# Patient Record
Sex: FEMALE | Race: BLACK OR AFRICAN AMERICAN | NOT HISPANIC OR LATINO | URBAN - METROPOLITAN AREA
[De-identification: names, ages, dates, MRNs, and addresses within clinical notes are randomized per-mention and may not be internally consistent; named-entity substitution may affect disease eponyms.]

---

## 2019-02-08 NOTE — PATIENT DISCUSSION
Surgery  Counseling: I have discussed the option of glasses versus cataract surgery versus following. It was explained that when vision no longer meets the patient's visual needs and a new prescription for glasses is not likely to improve the patient's visual symptoms, the option of cataract surgery is a reasonable next step. It was explained that there is no guarantee that removing the cataract will improve their visual symptoms, however; it is believed that the cataract is contributing to the patient's visual impairment and surgery may significantly improve both the visual and functional status of the patient. The risks, benefits and alternatives of surgery were discussed with the patient. After this discussion, the patient desires to proceed with cataract surgery with implantation of an intraocular lens to improve vision for golfing and glare.

## 2019-02-08 NOTE — PATIENT DISCUSSION
CATARACT, OU - EQUALLY VISUALLY SIGNIFICANT . SCHEDULE SX OS THEN LATER IN OD IF VISUAL SYMPTOMS PERSIST.  GLS RX GIVEN TO FILL IF DESIRES IN THE EVENT PT DOES NOT PROCEED W/ SX.

## 2019-02-08 NOTE — PATIENT DISCUSSION
REFRACTIVE ERROR, OU - DISCUSSED OPTION OF CORRECTING AT THE TIME OF CATARACT SURGERY. PT UNDERSTANDS AND ELECTS TO CONSIDER THEIR OPTIONS. THE PT WOULD LIKE TO BE LESS DEPENDENT ON GLASSES AT A DISTANCE AND UNDERSTANDS THAT HE WILL NEED GLASSES FOR ALL NEAR AND INTERMEDIATE ACTIVITIES.

## 2019-02-28 NOTE — PATIENT DISCUSSION
Continue: prednisolone acetate (prednisolone acetate): drops,suspension: 1% 1 drop four times a day into left eye 02-

## 2019-02-28 NOTE — PATIENT DISCUSSION
Surgery  Counseling: I have discussed the option of glasses versus cataract surgery versus following . It was explained that when vision no longer meets the patient's visual needs and a new prescription for glasses is not likely to improve all of the patient's visual symptoms, the option of cataract surgery is a reasonable next step. It was explained that there is no guarantee that removing the cataract will improve their visual symptoms, however; it is believed that the cataract is contributing to the patient's visual impairment and surgery may significantly improve both the visual and functional status of the patient. The risks, benefits and alternatives of surgery were discussed with the patient. After this discussion, the patient desires to proceed with cataract surgery with implantation of an intraocular lens to improve vision and reduce glare at night.

## 2019-03-14 NOTE — PATIENT DISCUSSION
***This patient had refractive cataract surgery performed. A monofocal IOL was placed to achieve a target refraction of  PLANO (which should provide them with satisfactory distance vision).

## 2022-07-05 RX ORDER — ASPIRIN 81 MG/1
TABLET, CHEWABLE ORAL
COMMUNITY

## 2022-07-05 RX ORDER — LISINOPRIL AND HYDROCHLOROTHIAZIDE 12.5; 1 MG/1; MG/1
TABLET ORAL
COMMUNITY

## 2022-07-05 RX ORDER — TOLTERODINE 2 MG/1
1 CAPSULE, EXTENDED RELEASE ORAL
COMMUNITY

## 2022-07-05 RX ORDER — BACLOFEN 10 MG/5ML
INJECTION INTRATHECAL
COMMUNITY

## 2022-08-11 ENCOUNTER — NEW PATIENT (OUTPATIENT)
Dept: URBAN - METROPOLITAN AREA CLINIC 12 | Facility: CLINIC | Age: 77
End: 2022-08-11

## 2022-08-11 DIAGNOSIS — H52.4: ICD-10-CM

## 2022-08-11 DIAGNOSIS — H04.123: ICD-10-CM

## 2022-08-11 DIAGNOSIS — H25.13: ICD-10-CM

## 2022-08-11 PROCEDURE — 92015 DETERMINE REFRACTIVE STATE: CPT

## 2022-08-11 PROCEDURE — 99204 OFFICE O/P NEW MOD 45 MIN: CPT

## 2022-08-11 ASSESSMENT — VISUAL ACUITY
OU_SC: 20/80
OD_SC: 20/80
OS_SC: 20/100

## 2022-08-11 ASSESSMENT — TONOMETRY
OS_IOP_MMHG: 18
OD_IOP_MMHG: 17

## 2022-09-27 ENCOUNTER — DIAGNOSTICS ONLY (OUTPATIENT)
Dept: URBAN - METROPOLITAN AREA CLINIC 17 | Facility: CLINIC | Age: 77
End: 2022-09-27

## 2022-09-27 DIAGNOSIS — H40.013: ICD-10-CM

## 2022-09-27 PROCEDURE — 92133 CPTRZD OPH DX IMG PST SGM ON: CPT

## 2022-09-27 PROCEDURE — 92083 EXTENDED VISUAL FIELD XM: CPT

## 2022-10-11 ENCOUNTER — ESTABLISHED PATIENT (OUTPATIENT)
Dept: URBAN - METROPOLITAN AREA CLINIC 17 | Facility: CLINIC | Age: 77
End: 2022-10-11

## 2022-10-11 DIAGNOSIS — H25.13: ICD-10-CM

## 2022-10-11 DIAGNOSIS — H40.013: ICD-10-CM

## 2022-10-11 PROCEDURE — 99214 OFFICE O/P EST MOD 30 MIN: CPT

## 2022-10-11 PROCEDURE — 92136 OPHTHALMIC BIOMETRY: CPT

## 2022-10-11 ASSESSMENT — VISUAL ACUITY
OD_GLARE: 20/400
OD_SC: 20/50
OS_GLARE: 20/400
OS_SC: 20/200-1

## 2022-10-11 ASSESSMENT — TONOMETRY
OS_IOP_MMHG: 18
OD_IOP_MMHG: 16

## 2022-10-19 PROBLEM — C7A.012 MALIGNANT CARCINOID TUMOR OF THE ILEUM (HCC): Status: ACTIVE | Noted: 2022-10-19

## 2022-10-28 ENCOUNTER — POST-OP (OUTPATIENT)
Dept: URBAN - METROPOLITAN AREA CLINIC 17 | Facility: CLINIC | Age: 77
End: 2022-10-28

## 2022-10-28 DIAGNOSIS — Z96.1: ICD-10-CM

## 2022-10-28 PROCEDURE — 99024 POSTOP FOLLOW-UP VISIT: CPT

## 2022-10-28 ASSESSMENT — VISUAL ACUITY: OS_SC: 20/30

## 2022-10-28 ASSESSMENT — TONOMETRY: OS_IOP_MMHG: 20

## 2022-11-02 ENCOUNTER — POST-OP (OUTPATIENT)
Dept: URBAN - METROPOLITAN AREA CLINIC 17 | Facility: CLINIC | Age: 77
End: 2022-11-02

## 2022-11-02 DIAGNOSIS — H25.11: ICD-10-CM

## 2022-11-02 DIAGNOSIS — Z96.1: ICD-10-CM

## 2022-11-02 DIAGNOSIS — H25.13: ICD-10-CM

## 2022-11-02 PROCEDURE — 92136 OPHTHALMIC BIOMETRY: CPT

## 2022-11-02 PROCEDURE — 99024 POSTOP FOLLOW-UP VISIT: CPT

## 2022-11-02 ASSESSMENT — VISUAL ACUITY: OS_SC: 20/30

## 2022-11-02 ASSESSMENT — TONOMETRY: OS_IOP_MMHG: 16

## 2022-11-18 ENCOUNTER — POST-OP (OUTPATIENT)
Dept: URBAN - METROPOLITAN AREA CLINIC 17 | Facility: CLINIC | Age: 77
End: 2022-11-18

## 2022-11-18 DIAGNOSIS — Z96.1: ICD-10-CM

## 2022-11-18 PROCEDURE — 99024 POSTOP FOLLOW-UP VISIT: CPT

## 2022-11-18 ASSESSMENT — VISUAL ACUITY: OD_SC: 20/30-1

## 2022-11-18 ASSESSMENT — TONOMETRY: OD_IOP_MMHG: 20

## 2022-12-05 ENCOUNTER — POST-OP (OUTPATIENT)
Dept: URBAN - METROPOLITAN AREA CLINIC 17 | Facility: CLINIC | Age: 77
End: 2022-12-05

## 2022-12-05 PROCEDURE — 99024 POSTOP FOLLOW-UP VISIT: CPT

## 2022-12-05 ASSESSMENT — TONOMETRY
OS_IOP_MMHG: 18
OD_IOP_MMHG: 17

## 2022-12-05 ASSESSMENT — VISUAL ACUITY
OU_SC: 20/30
OS_SC: 20/30
OD_SC: 20/30

## 2023-07-27 ENCOUNTER — ESTABLISHED PATIENT (OUTPATIENT)
Dept: URBAN - METROPOLITAN AREA CLINIC 12 | Facility: CLINIC | Age: 78
End: 2023-07-27

## 2023-07-27 DIAGNOSIS — H52.4: ICD-10-CM

## 2023-07-27 DIAGNOSIS — H40.013: ICD-10-CM

## 2023-07-27 DIAGNOSIS — H04.123: ICD-10-CM

## 2023-07-27 DIAGNOSIS — H26.493: ICD-10-CM

## 2023-07-27 DIAGNOSIS — Z96.1: ICD-10-CM

## 2023-07-27 PROCEDURE — 99214 OFFICE O/P EST MOD 30 MIN: CPT

## 2023-07-27 PROCEDURE — 92015 DETERMINE REFRACTIVE STATE: CPT

## 2023-07-27 ASSESSMENT — TONOMETRY
OS_IOP_MMHG: 15
OD_IOP_MMHG: 13

## 2023-07-27 ASSESSMENT — VISUAL ACUITY
OD_CC: 20/50
OS_CC: 20/40-2
OS_SC: 20/50-2
OD_SC: 20/40-2

## 2023-07-31 ENCOUNTER — DIAGNOSTICS ONLY (OUTPATIENT)
Dept: URBAN - METROPOLITAN AREA CLINIC 17 | Facility: CLINIC | Age: 78
End: 2023-07-31

## 2023-07-31 DIAGNOSIS — H40.013: ICD-10-CM

## 2023-07-31 PROCEDURE — 92083 EXTENDED VISUAL FIELD XM: CPT

## 2023-07-31 PROCEDURE — 92133 CPTRZD OPH DX IMG PST SGM ON: CPT

## 2024-01-25 ENCOUNTER — FOLLOW UP (OUTPATIENT)
Dept: URBAN - METROPOLITAN AREA CLINIC 12 | Facility: CLINIC | Age: 79
End: 2024-01-25

## 2024-01-25 DIAGNOSIS — Z96.1: ICD-10-CM

## 2024-01-25 DIAGNOSIS — H40.013: ICD-10-CM

## 2024-01-25 DIAGNOSIS — H04.123: ICD-10-CM

## 2024-01-25 PROCEDURE — 99213 OFFICE O/P EST LOW 20 MIN: CPT

## 2024-01-25 ASSESSMENT — TONOMETRY
OS_IOP_MMHG: 13
OD_IOP_MMHG: 13

## 2024-01-25 ASSESSMENT — VISUAL ACUITY
OD_CC: 20/40
OS_CC: 20/30

## 2024-07-25 ENCOUNTER — COMPREHENSIVE EXAM (OUTPATIENT)
Dept: URBAN - METROPOLITAN AREA CLINIC 12 | Facility: CLINIC | Age: 79
End: 2024-07-25

## 2024-07-25 DIAGNOSIS — H04.123: ICD-10-CM

## 2024-07-25 DIAGNOSIS — H43.813: ICD-10-CM

## 2024-07-25 DIAGNOSIS — H40.013: ICD-10-CM

## 2024-07-25 DIAGNOSIS — Z96.1: ICD-10-CM

## 2024-07-25 PROCEDURE — 92134 CPTRZ OPH DX IMG PST SGM RTA: CPT

## 2024-07-25 PROCEDURE — 99214 OFFICE O/P EST MOD 30 MIN: CPT

## 2024-07-25 ASSESSMENT — VISUAL ACUITY
OD_SC: 20/25
OS_SC: 20/20+2

## 2024-07-25 ASSESSMENT — TONOMETRY
OD_IOP_MMHG: 11
OS_IOP_MMHG: 13

## 2024-08-29 ENCOUNTER — DIAGNOSTICS ONLY (OUTPATIENT)
Dept: URBAN - METROPOLITAN AREA CLINIC 17 | Facility: CLINIC | Age: 79
End: 2024-08-29

## 2024-08-29 DIAGNOSIS — H40.013: ICD-10-CM

## 2024-08-29 PROCEDURE — 92083 EXTENDED VISUAL FIELD XM: CPT
